# Patient Record
(demographics unavailable — no encounter records)

---

## 2024-11-11 NOTE — PHYSICAL EXAM
[Normal Coordination] : normal coordination [Normal Sensation] : normal sensation [Normal Mood and Affect] : normal mood and affect [Oriented] : oriented [Able to Communicate] : able to communicate [Well Developed] : well developed [Well Nourished] : well nourished

## 2024-11-11 NOTE — HISTORY OF PRESENT ILLNESS
[FreeTextEntry1] : I had the pleasure of seeing Ms. Sade Starkey today in follow up.  Her previous history and physical findings have been reviewed.  She is under our care for chronic migraine headaches which she is receiving continuing active treatment for.  She states nothing has changed over the past few months with respect to her condition.  She states the headaches still occur but not as frequently as prior.  She is managed on a regimen of Aimovig 140 mg/ml which she injects once a month, Topamax 100 mg bid. Her headache frequency continues to be 3-4x a week. This is better to her than they had been in the past. Some days they are so severe she can hardly pick her head up.  She states they are localized to once side of her head and describes a jabbing, sharp pain.  When they occur, she will use [id number] mg prn or Ubrelvy 50 mg as needed depending on the severity which alleviates them.  She is accepting the degree of relief she gets and we will continue as is without change.    She has had MRI of the brain which just showed microvascular changes, which can be related to her migraine.

## 2025-03-12 NOTE — PHYSICAL EXAM
[Normal Coordination] : normal coordination [Normal Sensation] : normal sensation [Normal Mood and Affect] : normal mood and affect [Oriented] : oriented [Able to Communicate] : able to communicate [Well Developed] : well developed [Well Nourished] : well nourished [FreeTextEntry1] : PHYSICAL EXAMINATION: Head: Normocephalic, atraumatic. Negative TA tenderness/prominence.   Neck: Supple with full range of motion; nontender with negative bilateral Spurling's signs.   Spine: Full range of motion; nontender. Negative straight leg raise maneuvers.   Extremities: Non-tender. Atraumatic. Negative Tinel's signs.   NEUROLOGICAL EXAMINATION:   Mental Status: Patient is a good informant with intact orientation, attention, concentration, recent and remote memory. Language evaluation reveals no evidence of aphasia. Fund of knowledge is normal.   Cranial Nerves Cranial Nerves:   II, III, IV, VI: Pupils are equal, round, and reactive to light and accommodation. No evidence of afferent pupillary defect. Visual fields are full to confrontation. Eye movements are full without evidence of nystagmus or internuclear ophthalmoplegia. Funduscopic examination reveals sharp disc margins.   V: Normal jaw movements. Normal facial sensation.   VII: Normal facial motor testing.   VIII: Grossly normal hearing bilaterally.   IX, X: Palate moves symmetrically. No dysarthria.   XI: Normal shoulder shrug and sternocleidomastoid power.   XII: Tongue appears normal and protrudes in the midline.   Motor: Normal bulk, tone, and power throughout.   Muscle Stretch Reflexes (right/left): 2+ symmetrical.   Plantar Responses: Flexor bilaterally.   Coordination: Normal finger to nose and heel to shin testing, no truncal ataxia and no tremor.   Sensation: Normal primary sensation. Normal double simultaneous stimulation.   Gait and Station: Normal base, stride, and turning. Normal toe and heel walking. Normal tandem. Negative Romberg.

## 2025-03-12 NOTE — ASSESSMENT
[FreeTextEntry1] : Impression chronic recurring migraine headaches and lumbar radiculopathy patient will see pain management for her low back pain and I refilled her prescriptions for migraine prevention and abortive therapy.

## 2025-03-12 NOTE — HISTORY OF PRESENT ILLNESS
[FreeTextEntry1] : I had the pleasure of seeing Ms. Sade Starkey today in follow up.  Her previous history and physical findings have been reviewed.  She is under our care for chronic migraine headaches which she is receiving continuing active treatment for.  She states nothing has changed over the past few months with respect to her condition.  She states the headaches still occur but not as frequently as prior.  She is managed on a regimen of Aimovig 140 mg/ml which she injects once a month, Topamax 100 mg bid. Her headache frequency continues to be 3-4x a week. This is better to her than they had been in the past. Some days they are so severe she can hardly pick her head up.  She states they are localized to once side of her head and describes a jabbing, sharp pain.  When they occur, she will use [id number] mg prn or Ubrelvy 50 mg as needed depending on the severity which alleviates them.  She is accepting the degree of relief she gets and we will continue as is without change.   This is a follow-up on Sade Vela patient is being seen in follow-up for her migraine headaches she has been tried on a variety of medications and what she is found to be most effective is Aimovig 140 mg 1 cc subcu monthly plus topiramate 100 mg twice daily and rizatriptan 10 mg as needed for headache abatement.  These prescriptions were renewed the patient also mentions that she periodically has back pain radiating down the right leg and I made a referral to pain management for that complaint the headache control is mediocre despite all these medications she is averaging about 2-3 headaches a month but when she gets the headache she can only take the rizatriptan with 2 tablets she can take it daily or every other day. She has had MRI of the brain which just showed microvascular changes, which can be related to her migraine.

## 2025-03-19 NOTE — PHYSICAL EXAM
[Normal Coordination] : normal coordination [Normal DTR UE/LE] : normal DTR UE/LE  [Normal Sensation] : normal sensation [Normal Mood and Affect] : normal mood and affect [Oriented] : oriented [Able to Communicate] : able to communicate [Well Developed] : well developed [Well Nourished] : well nourished [Flexion] : flexion [Extension] : extension [] : no swelling

## 2025-03-19 NOTE — HISTORY OF PRESENT ILLNESS
[FreeTextEntry1] : This is a 67-year-old female here to establish care for lower back pain with radicular features into the right lower extremity. The pain travels down the leg to the root. She is referred to me by Dr. Lovell for a pain management consultation. There is no imaging for review at this time and she has not trialed physical therapy. She is currently taking Gabapentin for symptom control.

## 2025-03-19 NOTE — ASSESSMENT
[FreeTextEntry1] : This is a 67-year-old female with complaints of lower back pain with radicular features into the right lower extremity.  The pain travels down to the foot.  She is currently taking gabapentin for symptom control.  Patient has not trialed physical therapy therefore we will not order an MRI.  I advised that patient needs to complete 6 weeks of physical therapy before her insurance carrier will pay for an MRI.  She will follow-up in 6 weeks for reassessment. All this patients questions were answered and the conversation was understood well.  Physical therapy of the lumbar spine 2-3 times a week for 4-8 weeks stressing a home exercise program of walking, shoulder girdle strengthening,  swimming, elliptical , recumbent bike, Solomon chi and Yoga. Use things that heat like hot shower or icy heat before rehab, exercising and at the beginning of the day, and ice (ice in a bag never directly on the skin) after activity and at the end of the day.  Entered by Elizabeth Miller, acting as scribe for Dr. Garcia.  Documentation recorded by the scribe, in my presence, accurately reflects the service I personally performed, and the decisions made by me with my edits as appropriate.     Thank you for allowing me to assist in the management of this patient.     Best Regards,     Tamiko Garcia M.D., FAAPMR     Diplomate, American Board of Physical Medicine and Rehabilitation Diplomate, American Board of Pain Medicine

## 2025-05-07 NOTE — ASSESSMENT
[FreeTextEntry1] : This is a 67-year-old female with complaints of lower back pain with radicular features into the right lower extremity.  The pain travels down to the foot.  She failed gabapentin as well as 6 weeks of physical therapy.  Pain continues to be significant making it difficult for her to perform her ADLs. I will obtain an MRI of the lumbar spine for further evaluation. Patient will follow up in 3 weeks for reassessment.  All this patients questions were answered and the conversation was understood well.   Lumbar MRI without contrast was ordered to delineate spine pathology such as disc displacement and stenosis.   Entered by Elizabeth Miller, acting as scribe for Dr. Garcia.  Documentation recorded by the scribe, in my presence, accurately reflects the service I personally performed, and the decisions made by me with my edits as appropriate.     Thank you for allowing me to assist in the management of this patient.     Best Regards,     Tamiko Garcia M.D., FAAPMR     Diplomate, American Board of Physical Medicine and Rehabilitation Diplomate, American Board of Pain Medicine

## 2025-05-07 NOTE — HISTORY OF PRESENT ILLNESS
[FreeTextEntry1] : ORIGINAL PRESENTATION: This is a 67-year-old female here to establish care for lower back pain with radicular features into the right lower extremity. The pain travels down the leg to the root. She is referred to me by Dr. Lovell for a pain management consultation. There is no imaging for review at this time and she has not trialed physical therapy. She is currently taking Gabapentin for symptom control.   PATIENT PRESENTS FOR FOLLOW UP: She is under our care for lower back pain with radicular features into the right lower extremity. She attended 6 weeks of physical therapy at [facility] on [facility]. This provided her no relief. Patient also trialed and failed Gabapentin. At this point, an MRI of the lumbar spine would be necessary to evaluate for further treatment.

## 2025-05-27 NOTE — DATA REVIEWED
[FreeTextEntry1] : MRI of the lumbar spine dated 5/13/2025 demonstrates mild progression of chronic degenerative changes at L3-4 and L4-5 levels.  Remaining levels appear stable.  Comparison made to an MRI dated 4/20/2017.

## 2025-05-27 NOTE — HISTORY OF PRESENT ILLNESS
[FreeTextEntry1] : ORIGINAL PRESENTATION: This is a 67-year-old female here to establish care for lower back pain with radicular features into the right lower extremity. The pain travels down the leg to the root. She is referred to me by Dr. Lovell for a pain management consultation. There is no imaging for review at this time, and she has not trialed physical therapy. She is currently taking Gabapentin for symptom control.   PATIENT PRESENTS FOR FOLLOW UP: She is under our care for lower back pain with radicular features into the right lower extremity. She completed 6 weeks of physical therapy at [facility] on [facility]. This provided her no relief. Patient also trialed and failed Gabapentin. The MRI of the lumbar spine finds evidence of spinal stenosis. The option to trial a lumbar epidural steroid injection was discussed in detail. She is interested.

## 2025-05-27 NOTE — ASSESSMENT
[FreeTextEntry1] : This is a 67-year-old female with complaints of lower back pain with radicular features into the right lower extremity.  The pain travels down to the foot.   The pain has not responded to conservative care, including medications, stretching, as well as active modalities, such as physical therapy. Imaging studies as well as physical exam findings corroborate the symptomatology. I will proceed with an interlaminar LESI at the L4-5 level. Patient will follow up in 1-2 weeks post injection for efficacy and reassessment. All this patients questions were answered and the conversation was understood well.  Patient had a MRI that shows a radicular component along with pain referred into the lower extremity. Patient has trialed rehab (Home exercise, physical therapy or chiropractic care) and medications I will schedule an L4-5 interlaminar epidural steroid injection.  Risk including bleeding, infection, possible nerve damage, spinal fluid leak, benefits, pros and cons of procedure were explained to the patient using models and diagrams and their questions were answered.  Entered by Elizabeth Miller, acting as scribe for Dr. Garcia.  Documentation recorded by the scribe, in my presence, accurately reflects the service I personally performed, and the decisions made by me with my edits as appropriate.     Thank you for allowing me to assist in the management of this patient.     Best Regards,     Tamiko Garcia M.D., FAAPMR     Diplomate, American Board of Physical Medicine and Rehabilitation Diplomate, American Board of Pain Medicine

## 2025-06-06 NOTE — PROCEDURE
[FreeTextEntry3] :  Interlaminar Lumbar Epidural Steroid Injection     Date:  2025  Patient: TAMIE DUBOIS  :  1957  Preoperative Diagnosis: Lumbar Radiculopathy   Postoperative Diagnosis: Lumbar Radiculopathy    Procedure:  1. Interlaminar L4-5 Lumbar Epidural Injection under fluoroscopy 2. Epidurography 3. Fluoroscopic guidance and localization of needle    Physician: Tamiko Garcia M.D.   Anesthesia: local.   Medical Necessity:  Failure of conservative management.  Consent:  Though unusual, the possible complications including infection, bleeding, nerve damage, hospital admission, stroke, pneumothorax, death or failure of the procedure are theoretically possible. The patient was educated about the of the procedure and alternative therapies. All questions were answered and the patient freely gave consent to proceed.    Indication for Fluoroscopy:  This procedure requires the precise placement of the spinal needle into the epidural space.  It is the only way to accurately and safely perform the injection.   PROCEDURE NOTE:   The patient was placed in the prone position. The lumbosacral region was prepped with betadine and draped in usual sterile fashion. A time out was performed.  The L4-5 interspace was identified using fluoroscopy. The skin was infiltrated with lidocaine 2% -- 2mL for subcutaneous analgesia. The epidural space was identified using a 20g touhy needle with a midline approach using a loss of resistance technique. 2mL omnipaque was used to define the space. A solution of 6ml of preservative-free sterile saline and 1ml of depomedrol 80mg was infused in 1mL increments slowly with minimal pressure on the syringe into the epidural space. The procedure was tolerated well. There was no evidence of CSF, paresthesias, or heme.     Epidurogram: Distal and proximal spread was noted.  Complications: none.    Disposition: I have examined the patient and there are no new physical findings since original presentation. The patient was discharged home with a . The discharge instruction sheet was given to the patient. Motor function was intact.   Comment: 1st Interlaminar ALEJO today, depending on effectiveness would schedule 2nd Interlaminar in 1-2 weeks vs caudal epidural steroid vs follow up in office. Call if any problems   This document was signed by:   Tamiko Garcia MD, FAAPMR Diplomate, American Board of Physical Medicine and Rehabilitation Diplomate, American Board of Pain Medicine

## 2025-06-18 NOTE — HISTORY OF PRESENT ILLNESS
[FreeTextEntry1] : ORIGINAL PRESENTATION: This is a 67-year-old female here to establish care for lower back pain with radicular features into the right lower extremity. The pain travels down the leg to the root. She is referred to me by Dr. Lovell for a pain management consultation. There is no imaging for review at this time, and she has not trialed physical therapy. She is currently taking Gabapentin for symptom control.   PATIENT PRESENTS FOR FOLLOW UP: She is under our care for lower back pain with radicular features into the right lower extremity. She is s/p a L4-5 LESI on 6/06/25 which provided her with 75% relief of her back pain but not of her leg pain. She continues to experience pain radiating down the right leg into the foot. Pain increases with sitting making it difficult for her to drive. She rates her pain a 5/10 on the pain scale. The option to trial an alternative injection approach to target the right side was discussed in detail and she is eager to proceed.

## 2025-06-18 NOTE — ASSESSMENT
[FreeTextEntry1] : This is a 67-year-old female with complaints of lower back pain with radicular features into the right lower extremity.  The pain travels down to the foot. She is s/p a L4-5 LESI on 6/06/25 which provided her with 75% relief of the lower back pain, but she continues to experience radicular symptoms into the right lower extremity. Pain is worse with sitting.  Imaging studies as well as physical exam findings corroborate the symptomatology. I will proceed with a RT L4-S1 TFESI x1 under fluoroscopic guidance. Patient will follow up in 1-2 weeks post injection for efficacy and reassessment. All this patients questions were answered and the conversation was understood well.  Patient had an MRI that shows a radicular component along with pain referred into the lower extremity. Patient has trialed rehab (home exercise, physical therapy or chiropractic care) and medications. I will schedule a right L4-S1 TFESI.  Risk, benefits, pros and cons of procedure were explained to the patient using models and diagrams and their questions were answered.   Entered by Elizabeth Miller, acting as scribe for Dr. Garcia.  Documentation recorded by the scribe, in my presence, accurately reflects the service I personally performed, and the decisions made by me with my edits as appropriate.     Thank you for allowing me to assist in the management of this patient.     Best Regards,     Tamiko Garcia M.D., FAAPMR     Diplomate, American Board of Physical Medicine and Rehabilitation Diplomate, American Board of Pain Medicine

## 2025-07-11 NOTE — PROCEDURE
[FreeTextEntry3] :  SELECTIVE TRANSFORAMINAL LUMBAR EPIDURAL NERVE ROOT INJECTION     Date:  2025  Patient: TAMIE DUBOIS  :  1957   Preoperative Diagnosis: Right left L5 radiculitis; Right L4 radiculitis  Procedure: Selective Right L4-5; L5-S1 Transforaminal Lumbar Epidural Nerve Root Injection under Fluoroscopy  Physician: Tamiko Garcia MD FAAPMR  Anesthesia: See nurses notes/Local/Cold spray  Medical Necessity:  Failure of conservative management.   Indication for Fluoroscopy:  This procedure requires the precise placement of the spinal needle into the specific paravertebral foramen.  It is the only way to accurately and safely perform the injection.   CONSENT: The possible complications including infection, bleeding, nerve damage, Hospital admission, death or failure of the procedure; though unusual, are theoretically possible. The patient was educated about the of the procedure and alternative therapies. All questions were answered and the patient freely gave consent to proceed.    Monitoring:  Patient had continuous blood pressure, EKG, and pulse oximetry throughout the case. See nurse's notes     PROCEDURE NOTE:  After obtaining written consent, the patient was placed on the fluoroscopic table in the prone position with the pillow placed under the hips. The lumbar area was prepped with betadine solution and draped in the usual manner. A time out was performed.  Cold spray was used to localize the area. The fluoroscope was used to identify the L3///L4///L5 vertebral body on the AP projection. It was then rotated into an oblique projection until the superior articulating process of the L5 (inferior) vertebra is projected beneath the 6 o'clock position of the L4 (superior) vertebrae. The 22 gauge 3.5inch needle was inserted in the skin at a point overlying the superior articulating process of the inferior vertebra and aimed for the 6 o'clock position of the superior vertebrae's pedicle.  After the needle contacted bone, a lateral projection was obtained to insure that the needle tip was in proximity with the vertebral body. Paresthesia's were not noted.  One ml of Omnipaque 240 was injected and a neurogram was obtained. Following demonstration of the neurogram, 1 ml of Preservative free normal saline and 1 ml of depomedrol (40mg) was injected. The small volume and relatively high concentration was chosen to preserve selectivity and diagnostic value of the injection. There was no CSF or heme identified.  The L5-S1 level was injected in identical fashion. There were no signs of, intravascular block or hypotension. The needle was removed intact. A band aid was place on the site.     Epidurogram: The nerve root was observed in its outline on the neurogram. Distal and proximal spread was noted pointing away from epidural fibrosis/scarring.  Complications: None. The patient tolerated the procedure well.     Disposition: I have examined the patient and there are no new physical findings since the original presentation.  Sensory and motor function were intact. The patient met discharge criteria see nurses notes. The discharge instruction sheet was reviewed and given to the patient. The patient was discharged home.     Comment: 1st SNRI today, depending on effectiveness would schedule 2nd SNRI in 1-2 weeks vs caudal epidural steroid vs follow up in office depending on insurances. Follow up office. Call if any problems     This document was electronically signed by:  Tamiko Garcia MD, FAAPMR Diplomate, American Board of Physical Medicine and Rehabilitation Diplomate, American Board of Pain Medicine

## 2025-07-17 NOTE — PLAN
[FreeTextEntry1] : patient on estradiol .5 mg po qd she says she takes 1/2 tab daily. also h/o tacarlosso

## 2025-07-17 NOTE — HISTORY OF PRESENT ILLNESS
[FreeTextEntry1] : Patient is in office for an annual exam.. Patient on estradiol Continue balanced diet with all food groups. Brush teeth twice a day with toothbrush. Recommend visit to dentist. As per car seat 's guidelines, use forward-facing booster seat until child reaches highest weight/height for seat. Child needs to ride in a belt-positioning booster seat until  4 feet 9 inches has been reached and are between 8 and 12 years of age. Put child to sleep in own bed. Help child to maintain consistent daily routines and sleep schedule.  discussed. Ensure home is safe. Teach child about personal safety. Use consistent, positive discipline. Read aloud to child. Limit screen time to no more than 2 hours per day. mg tabs po qd h/o tahbso

## 2025-07-22 NOTE — ASSESSMENT
[FreeTextEntry1] : This is a 67-year-old female with complaints of lower back pain with radicular features into the right lower extremity.  The pain travels down to the foot. She is s/p a L4-5 LESI on 6/06/25 which provided her with 75% relief of the lower back pain, but she continued to experience radicular symptoms into the right lower extremity. She subsequently underwent a right L4-5, L5-S1 TFESI on 7/11/25 which she states gave her relief of the right leg pain. She reports 40-50% relief. She will re-start PT for her lumbar spine. She will continue to take gabapentin which is already prescribed to her for her migraines. I have prescribed her diclofenac 50 mg twice a day as needed. Patient will follow-up in 3 months for reevaluation. She will call barring any issues.  - The patient was provided with a prescription for Physical Therapy. Physical therapy of the lumbar spine 2-3 times a week for 4-8 weeks stressing a home exercise program of walking, shoulder girdle strengthening,  swimming, elliptical , recumbent bike, Solomon chi and Yoga. Use things that heat like hot shower or icy heat before rehab,exercising and at the beginning of the day, and ice (ice in a bag never directly on the skin) after activity and at the end of the day.   I advised Ms. Starkey that the NSAID should be taken with food. In addition, while taking the prescribed NSAID, no over the counter or other NSAIDs should be used, such as ibuprofen (Motrin or Advil) or naproxen (Aleve) as this can cause stomach upset or other side effects. If needed for fever or breakthrough pain Tylenol can be used.   JEM Mccarthy MD

## 2025-07-22 NOTE — HISTORY OF PRESENT ILLNESS
[FreeTextEntry1] : ORIGINAL PRESENTATION: This is a 67-year-old female here to establish care for lower back pain with radicular features into the right lower extremity. The pain travels down the leg to the root. She is referred to me by Dr. Lovell for a pain management consultation. There is no imaging for review at this time, and she has not trialed physical therapy. She is currently taking Gabapentin for symptom control.   PATIENT PRESENTS FOR FOLLOW UP: She is under our care for lower back pain with radicular features into the right lower extremity. She is s/p a L4-5 LESI on 6/06/25 which provided her with 75% relief of her back pain but not of her leg pain. She continued to experience pain radiating down the right leg into the foot. She subsequently underwent a right L4-5, L5-S1 TFESI on 7/11/25 which she states gave her relief of the right leg pain. She reports 40-50% relief. She is able to sit more comfortably. Patient has trialed PT in the past but ran out of sessions. We discussed re-starting PT which she is agreeable with. She reports she swims on a daily basis. She takes gabapentin 600 mg at nighttime for her migraines. I have offered to increase her dose of the gabapentin which she does not want to do due to increased drowsiness.